# Patient Record
Sex: FEMALE | Race: BLACK OR AFRICAN AMERICAN | NOT HISPANIC OR LATINO
[De-identification: names, ages, dates, MRNs, and addresses within clinical notes are randomized per-mention and may not be internally consistent; named-entity substitution may affect disease eponyms.]

---

## 2017-04-24 ENCOUNTER — HOSPITAL (OUTPATIENT)
Dept: OTHER | Age: 27
End: 2017-04-24
Attending: FAMILY MEDICINE

## 2017-04-29 ENCOUNTER — HOSPITAL (OUTPATIENT)
Dept: OTHER | Age: 27
End: 2017-04-29
Attending: FAMILY MEDICINE

## 2017-06-02 ENCOUNTER — HOSPITAL (OUTPATIENT)
Dept: OTHER | Age: 27
End: 2017-06-02
Attending: INTERNAL MEDICINE

## 2017-06-02 LAB
UA APPEAR POC: CLEAR
UA BILI POC: NEGATIVE
UA BLOOD POC: ABNORMAL
UA COLOR POC: YELLOW
UA GLUCOSE POC: NEGATIVE
UA KETONES POC: NEGATIVE
UA LEUK EST POC: ABNORMAL
UA NITRITE POC: NEGATIVE
UA PH POC: 7 (ref 5–7)
UA PROTEIN POC: NEGATIVE
UA SPEC GRAV POC: 1.01 (ref 1.01–1.02)
UA UROBILIN POC: 1 MG/DL

## 2017-07-31 ENCOUNTER — HOSPITAL ENCOUNTER (EMERGENCY)
Facility: HOSPITAL | Age: 27
Discharge: HOME | End: 2017-07-31
Attending: EMERGENCY MEDICINE
Payer: MEDICAID

## 2017-07-31 VITALS
WEIGHT: 225 LBS | SYSTOLIC BLOOD PRESSURE: 151 MMHG | HEART RATE: 89 BPM | DIASTOLIC BLOOD PRESSURE: 103 MMHG | HEIGHT: 68 IN | RESPIRATION RATE: 18 BRPM | TEMPERATURE: 98 F | OXYGEN SATURATION: 99 % | BODY MASS INDEX: 34.1 KG/M2

## 2017-07-31 DIAGNOSIS — Z71.1 FEARED CONDITION NOT DEMONSTRATED: Primary | ICD-10-CM

## 2017-07-31 PROCEDURE — 99283 EMERGENCY DEPT VISIT LOW MDM: CPT

## 2017-07-31 NOTE — ED PROVIDER NOTES
Patient Seen in: [facility] Emergency Department    History   Patient presents with:  Altered Mental Status (neurologic)    Stated Complaint: UNRESPONSIVE     HPI    The patient is a 66-year-old previously healthy female brought in for evaluation be strength and sensation. HEENT: No lymphadenopathy. Mucus membranes moist.   Supple neck  Cardiovascular:    Regular rhythm without murmurs rubs or gallops, no peripheral edema or JVD. Radial and DP pulses 2+ bilaterally.   Lungs: Speaking full sentences
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
## 2017-07-31 NOTE — ED NOTES
Pt talk with MD about concerns of visit to ed, pt alert and talking, pt able to ambulate, gate steady, pt denies pain or discomfort, pt ready for dc home

## 2018-03-01 ENCOUNTER — HOSPITAL (OUTPATIENT)
Dept: OTHER | Age: 28
End: 2018-03-01
Attending: FAMILY MEDICINE

## 2018-03-01 LAB
CONTROL LINE: PRESENT
Lab: CLEAR
UA APPEAR POC: CLEAR
UA BILI POC: NEGATIVE
UA BLOOD POC: NEGATIVE
UA COLOR POC: YELLOW
UA GLUCOSE POC: NEGATIVE
UA KETONES POC: NEGATIVE
UA LEUK EST POC: NEGATIVE
UA NITRITE POC: NEGATIVE
UA PH POC: 6.5 (ref 5–7)
UA PROTEIN POC: NEGATIVE
UA SPEC GRAV POC: 1.01 (ref 1.01–1.02)
UA UROBILIN POC: 0.2 MG/DL
URINE PREG POC: NEGATIVE

## 2018-04-06 ENCOUNTER — HOSPITAL (OUTPATIENT)
Dept: OTHER | Age: 28
End: 2018-04-06
Attending: FAMILY MEDICINE

## 2018-04-19 ENCOUNTER — HOSPITAL (OUTPATIENT)
Dept: OTHER | Age: 28
End: 2018-04-19
Attending: EMERGENCY MEDICINE

## 2018-04-26 ENCOUNTER — HOSPITAL (OUTPATIENT)
Dept: OTHER | Age: 28
End: 2018-04-26
Attending: FAMILY MEDICINE

## (undated) NOTE — ED AVS SNAPSHOT
Miriam Wagoner   MRN: [id number]    Department:  [facility] Emergency Department   Date of Visit:  7/31/2017           Disclosure     Insurance plans vary and the physician(s) referred by the ER may not be covered by your plan.  Please contact your i If you have been prescribed any medication(s), please fill your prescription right away and begin taking the medication(s) as directed    If the emergency physician has read X-rays, these will be re-interpreted by a radiologist.  If there is a significant